# Patient Record
Sex: FEMALE | Race: WHITE
[De-identification: names, ages, dates, MRNs, and addresses within clinical notes are randomized per-mention and may not be internally consistent; named-entity substitution may affect disease eponyms.]

---

## 2017-04-04 NOTE — US
EXAM DESCRIPTION:

Gall Bladder



CLINICAL HISTORY:

61 years Female, RIGHT UPPER QUAD PAIN



Grayscale ultrasound of the liver, gallbladder and pancreas was

performed.



FINDINGS:

The liver is echogenic. It is normal in size measuring 13-14 cm

in diameter. No hepatic mass at this time. The hepatic veins and

portal vein are patent.



The pancreas is unremarkable on today's study.



The gallbladder is well visualized. No evidence of cholelithiasis

patient reportedly positive sonographic Paredes's sign.



IMPRESSION:

Probable fatty infiltration of the liver.



Gallbladder is well visualized. While the patient reports a

positive sonographic Paredes sign no definitive echogenic gall

stones on today's study.



Electronically signed by:  Emil Raymundo MD  4/4/2017 10:17 AM

CDT

## 2017-04-14 NOTE — NM
EXAM DESCRIPTION:



Hepatobiliar w/CCK



CLINICAL HISTORY:



61 years,Female, abdominal pain



COMPARISON:



None





TECHNIQUE:



The patient was injected with six mCi of technetium-99 M labeled

Mebrofenin followed by imaging of the right upper quadrant. After

1 hr CCK was injected for the gallbladder ejection fraction.





FINDINGS:



Normal homogeneous uptake of radiopharmaceutical is seen

throughout the liver. There isprompt excretion into the biliary

collecting system, gallbladder, and small bowel.



The gallbladder ejection fraction is 89 %.  Normal range is

greater than 30 or 35%. 









IMPRESSION:



Unremarkable HIDA scan. The ejection fraction of the gallbladder

is a 89%. 







Electronically signed by:  Eris Tony MD  4/14/2017 11:26 AM CDT

## 2017-04-24 NOTE — CT
EXAM DESCRIPTION: 



Abdomen/Pelvis w/wo Contrast



CLINICAL HISTORY: 



ABDOMINAL PAIN



COMPARISON: 



None Available



TECHNIQUE: 



CT of the abdomen and Pelvis was performed with and without IV

contrast. This exam was performed according to our departmental

dose-optimization program, which includes automated exposure

control, adjustment of the mA and/or kV according to patient size

and/or use of iterative reconstruction technique.



FINDINGS: 



The lung bases are unremarkable. There is no calcified gallstone

or renal calculus. No abdominal aortic aneurysm or dissection. No

adenopathy, ascites or pneumoperitoneum. The liver, spleen,

pancreas, adrenals and kidneys are unremarkable. There are no

dilated small bowel loops. No bladder wall thickening. Diffusely

heterogeneous appearance and lobulated contour of the uterus

suggests several uterine fibroids. There is a partially calcified

1.4 cm submucosal fibroid in the uterine fundus. There is a 1.5

cm right adnexal cyst which may be of right ovarian origin.

Considering its size, this is almost certainly benign, and no

follow-up imaging is recommended. There is occasional colonic

diverticulosis without diverticulitis. The appendix is normal. No

suspicious bone lesion.



IMPRESSION: 



Uterine fibroids as detailed above, but no acute abnormality in

the abdomen or pelvis to explain patient's symptoms.



Colonic diverticulosis without diverticulitis.



Electronically signed by:  Andrey Shin MD  4/24/2017 7:43 AM CDT

## 2019-04-26 NOTE — MRI
EXAM DESCRIPTION: 

Brain w/wo Contrast: Magnetic Resonance Imaging.



CLINICAL HISTORY: 

63 years Female MULTIPLE SCLEROSIS



COMPARISON: 

MRI scan of the brain with and without contrast 12/18/2015.



TECHNIQUE: 

Multiplanar, high-field MRI, multiple conventional sequences,

without and with gadolinium IV  contrast. No adverse reactions.

Multiple axial diffusion sequences. 



FINDINGS: 

Small foci of hyperintense FLAIR and T2-weighted signal in the

anterior right frontal corona radiata. Also the right frontal

subcortical white matter near the vertex. No mass effect

hemorrhage or diffusion restriction or contrast enhancement

associated with these 2 sites..  Normal signal in the bilateral

basal ganglia. No hemorrhage, no cerebral edema, no mass-effect.

Normal contrast enhancement. Normal signal in the brainstem and

cerebellar hemispheres. No hemorrhage, no cerebral edema, no

mass-effect. Normal contrast enhancement.



Concordance of the diffusion and non-diffusion sequences with no

evidence of acute or subacute infarction. Cortical sulci,

ventricles, and other CSF spaces, and the subdural spaces are

normally configured for patients age. No effacement or

displacement. No midline shift. No extra-axial hemorrhage. Normal

contrast enhancement.



Normal flow signal void in the major vessels of the [place]

[place], and the venous sinuses. IACs are symmetric bilaterally.

Minimal fluid in a few of the left mastoid air cells. Normal

signal on the right. No mass effect in the bilateral

Cerebellopontine angles. Normal contrast enhancement.



Pituitary gland occupies approximately 50% of the sella. Normal

contrast enhancement. Base of the cerebellar tonsils is at the

level of the foramen magnum. Normal signal in the paranasal

sinuses. The bony calvarium is intact.



IMPRESSION: 

1. 2 sites of abnormal T2 and FLAIR signal in the right frontal

lobe periventricular white matter and subcortical white matter.

Not associated with mass effect hemorrhage or abnormal contrast

enhancement or diffusion restriction. Improvement compared to the

prior MRI scan in 2015.

2. Minimal right mastoiditis. No diffusion restriction indicating

no evidence of acute or subacute infarction or severe ischemia.



Electronically signed by:  Nilson Abarca MD  4/26/2019 1:51 PM CDT

Workstation: [id number]

## 2019-08-28 NOTE — RAD
EXAM DESCRIPTION: Lumbar Spine 3 Views



CLINICAL HISTORY: 63 years Female, PAIN IN UNSPECIFIED HIP RIGHT



COMPARISON: None.



TECHNIQUE: 3 views of the lumbar spine.



FINDINGS: Mild diffuse osteopenia of the visualized bones noted.

The normal lumbar lordotic curvature is well-maintained. The

vertebral body heights are well-maintained with no acute

compression deformity. The intervertebral disc space are

well-maintained. Grade 1 anterolisthesis of L5 over S1 noted.

Mild multilevel degenerative changes noted. The pre and

paravertebral soft tissues appear grossly unremarkable.



IMPRESSION:



1. No acute compression deformity.

2. Mild multilevel degenerative changes more predominant at L5-S1

level with grade 1 anterolisthesis of L5 over S1.



Electronically signed by:  Bryce Jefferson MD  8/28/2019

11:12 AM CDT Workstation: [id number]

## 2019-08-28 NOTE — RAD
EXAM DESCRIPTION: Hip,Right 2 Views



CLINICAL HISTORY: 63 years Female, PAIN IN UNSPECIFIED HIP

RIGHTPAIN IN UNSPECIFIED HIP RIGHT



COMPARISON: None available. Technique: AP and lateral radiograph

of the right hip.



FINDINGS: The visualized bones are well-mineralized.No acute

fracture or dislocation. The femoral head is well contained in

the acetabular fossa. Mild to moderate right hip joint

osteoarthritic changes noted.

The soft tissues appear grossly unremarkable. 



IMPRESSION:

1. No acute fracture or dislocation. Mild to moderate right hip

joint osteoarthritic changes.



Electronically signed by:  Bryce Jefferson MD  8/28/2019

11:13 AM CDT Workstation: [id number]

## 2019-08-28 NOTE — RAD
EXAM DESCRIPTION: 



Pelvis, single view



CLINICAL HISTORY: 



PAIN IN UNSPECIFIED HIP RIGHT 



FINDINGS/ IMPRESSION: 



Normal mineralization. No fracture or focal osteochondral lesion.

No advanced arthrosis





Electronically signed by:  Dago Ward MD  8/28/2019 11:04

AM CDT Workstation: [id number]

## 2019-10-07 NOTE — MRI
Study: MRI of the Right Hip. 



Indication: HIP PAIN  



Technique: Multiplanar, multi sequence MRI of the right hip was

obtained without intravenous contrast. 



Comparison: None.



Findings:



Full-thickness tear at the base of the anterior superior right

hip labrum at the 2:00 to 1:00 position with mild free edge

truncation. Tiny right hip cam lesion noted. Slight labral over

coverage present as well.



No acute fracture or osteonecrosis of the right hip. Grade 2 and

mild grade 3 chondral thinning throughout the right hip joint. No

full-thickness chondral defect. Tiny joint effusion.



Tendinosis bilateral gluteus minimus/medius tendon insertions

with moderate bilateral greater trochanter bursal edema.

Tendinosis bilateral hamstring tendon origins. Moderate pubic

symphysis osteoarthritis.



Impression:



Mild right hip osteoarthritis with a tiny joint effusion and

anterior superior labral tearing. No acute fracture or

osteonecrosis.



Additional findings as above.



 



Electronically signed by:  Crispin Lanza MD  10/7/2019 11:31 AM

CDT Workstation: [id number]

## 2019-10-07 NOTE — MRI
EXAM DESCRIPTION: 



Lumbar Spine w/o Contrast



CLINICAL HISTORY: 



63 years, Female, LOW BACK PAIN



COMPARISON: 



None



TECHNIQUE: 



Multiplanar multi sequence images of the lumbar spine were

obtained without gadolinium contrast.



FINDINGS: 



Vertebral body height and alignment are well maintained. No bone

marrow signal abnormalities. The conus lies posterior to the L1-2

disc, and the cauda equina is unremarkable. The paraspinal and

visualized retroperitoneal soft tissues are unremarkable. Small

Tarlov cyst in the central canal posterior to the S2 segment seen

only on sagittal images and a doubtful clinical significance.



L1-2:  No disc bulging or facet joint degeneration. 

L2-3:  No significant disc bulging or facet joint degeneration.

L3-4:  The intervertebral disc and facet joints are unremarkable.

L4-5:  No disc bulging or facet joint degeneration.

L5-S1:  No disc bulging or facet joint degeneration.



IMPRESSION: 



No acute abnormality or significant degenerative changes in the

lumbar spine.



Electronically signed by:  Andrey Shin MD  10/7/2019 10:56 AM CDT

Workstation: [id number]

## 2020-01-09 ENCOUNTER — HOSPITAL ENCOUNTER (OUTPATIENT)
Dept: HOSPITAL 39 - LAB.O | Age: 65
End: 2020-01-09
Attending: FAMILY MEDICINE
Payer: COMMERCIAL

## 2020-01-09 DIAGNOSIS — E78.5: ICD-10-CM

## 2020-01-09 DIAGNOSIS — R10.13: Primary | ICD-10-CM

## 2020-01-09 NOTE — US
EXAM DESCRIPTION: Gall Bladder



CLINICAL HISTORY: EPIGASTRIC PAIN



COMPARISON: Previous gallbladder sonogram April 4, 2017



TECHNIQUE: Right upper quadrant ultrasound



FINDINGS:



Pancreas: Visualized portions of the pancreas show prominent

pancreatic duct 2.1 mm in diameter, unchanged from previous

study. Bowel gas obscures some areas.



Aorta/inferior vena cava: No aortic aneurysm. Normal inferior

vena cava.



Liver: The liver is homogeneous in texture with normal to

slightly increased echogenicity of the hepatic parenchyma. No

focal liver lesion or intrahepatic bile duct dilatation. No liver

surface irregularity. Normal appearance of the portal vein and

hepatic veins.



Gallbladder: Gallbladder appears normal  with no intraluminal

stones or wall thickening. 



Common bile duct: Normal caliber measuring 4.6 mm.



Right kidney: Renal length is 9.4 cm. Normal cortical

echogenicity. Cortical thickness is normal. No hydronephrosis is

seen. No renal mass or shadowing calculus.



IMPRESSION:



No acute process. No change compared to previous study.







Electronically signed by:  Myles Alex MD  1/9/2020 5:32 PM

CST Workstation: [id number]

## 2020-01-31 ENCOUNTER — HOSPITAL ENCOUNTER (OUTPATIENT)
Dept: HOSPITAL 39 - AMB | Age: 65
Discharge: HOME | End: 2020-01-31
Attending: SPECIALIST
Payer: COMMERCIAL

## 2020-01-31 VITALS — TEMPERATURE: 98.2 F | OXYGEN SATURATION: 98 % | DIASTOLIC BLOOD PRESSURE: 67 MMHG | SYSTOLIC BLOOD PRESSURE: 115 MMHG

## 2020-01-31 DIAGNOSIS — K29.70: ICD-10-CM

## 2020-01-31 DIAGNOSIS — K21.9: ICD-10-CM

## 2020-01-31 DIAGNOSIS — F02.80: ICD-10-CM

## 2020-01-31 DIAGNOSIS — K31.89: ICD-10-CM

## 2020-01-31 DIAGNOSIS — G30.9: ICD-10-CM

## 2020-01-31 DIAGNOSIS — Z79.899: ICD-10-CM

## 2020-01-31 DIAGNOSIS — R19.5: Primary | ICD-10-CM

## 2020-01-31 DIAGNOSIS — K59.00: ICD-10-CM

## 2020-01-31 DIAGNOSIS — E78.00: ICD-10-CM

## 2020-01-31 PROCEDURE — 87101 SKIN FUNGI CULTURE: CPT

## 2020-01-31 PROCEDURE — 45380 COLONOSCOPY AND BIOPSY: CPT

## 2020-01-31 PROCEDURE — 00813 ANES UPR LWR GI NDSC PX: CPT

## 2020-01-31 PROCEDURE — 43239 EGD BIOPSY SINGLE/MULTIPLE: CPT

## 2020-01-31 NOTE — OP
DATE OF PROCEDURE:  01/31/20



PREOPERATIVE DIAGNOSIS: 

1.  Hemoccult positive.

2.  Abdominal epigastric pain.



POSTOPERATIVE DIAGNOSIS: 

1.  Gastritis, possible fungal growth.

2.  Inflammation of the cecum.



PROCEDURE: 

1.  EGD with stomach biopsy times 2.

2.  Colonoscopy with cecal biopsy times 1.



SURGEON:  Lester Whitehead MD



ANESTHESIA: General.



FINDINGS: As described.



PROCEDURE:  In the lateral position with a bite block, general anesthesia was 
induced.  The endoscope was entered without difficulty.  The esophagus going in 
appeared normal, as did the Z-line.  We entered the stomach and went into the 
second portion of the duodenum without difficulty.  There were no abnormalities 
seen.  Upon withdrawal, we did see a lot of white plaques consistent with 
possible fungus on the stomach itself, primarily in the antrum and distal body, 
so biopsy of this was taken for fungal stain.  Additional biopsy was taken as 
well and sent for pathology and H. pylori.  Retroflexion was normal.  There were
no other abnormalities seen.  Upon withdrawal, the esophagus remained normal.



She was then repositioned and colonoscopy was performed.  Digital rectal exam 
was normal.  The colonoscope was then passed without difficulty all the way to 
the cecum identified by the ileocecal valve.  The cecum itself, going into just 
the proximal ascending had a lot of linear vascular streaks consistent with a 
colitis.  No mucus, no ulcers, but more vascularity, so representative biopsy 
was taken.  It was irrigated and examined.  There was no free blood.  Upon 
additional withdrawal, no other polyps were seen.  It was a little spasmodic and
thick walled in places, but otherwise no polyps.  On retroflexion, the rectum 
was normal.  There were small hemorrhoids.  The patient tolerated the procedure 
and was then awakened and taken to Recovery to be discharged.  We will followup 
with pathology results.  



#[id number]

[facility]

## 2020-02-02 ENCOUNTER — HOSPITAL ENCOUNTER (EMERGENCY)
Dept: HOSPITAL 39 - ER | Age: 65
Discharge: HOME | End: 2020-02-02
Payer: COMMERCIAL

## 2020-02-02 VITALS — OXYGEN SATURATION: 96 % | DIASTOLIC BLOOD PRESSURE: 74 MMHG | SYSTOLIC BLOOD PRESSURE: 101 MMHG

## 2020-02-02 VITALS — TEMPERATURE: 97.4 F

## 2020-02-02 DIAGNOSIS — Z79.899: ICD-10-CM

## 2020-02-02 DIAGNOSIS — S00.83XA: ICD-10-CM

## 2020-02-02 DIAGNOSIS — S09.90XA: Primary | ICD-10-CM
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
## 2020-02-02 NOTE — ED.PDOC
History of Present Illness





- General


Chief Complaint: Trauma


Stated Complaint: unwitnessed fall at home


Time Seen by Provider: 02/02/20 22:06





- History of Present Illness


Initial Comments: 





Pt has bruise on her forehead ,  is concern that she has dementia and she

does not know when she got the injury on the forehead , no witness for the fall 

, no witness if pt was unconscious.


Timing/Duration: 4-6 hours


Severity: moderate


Improving Factors: nothing


Worsening Factors: nothing


Associated Symptoms: denies symptoms


Allergies/Adverse Reactions: 


Allergies





NO KNOWN ALLERGY Allergy (Verified 02/02/20 22:09)


   





Home Medications: 


Ambulatory Orders





Atorvastatin Calcium [Lipitor] 20 mg PO DAILY 01/30/20 


Cephalexin 500 mg PO TID 01/30/20 


Donepezil Hydrochloride [Donepezil HCl] 10 mg PO BEDTIME 01/30/20 


Pantoprazole Sodium 40 mg PO DAILY 01/30/20 


Propranolol HCl 40 mg PO DAILY 01/30/20 


QUEtiapine FUMARATE [SEROquel] 25 mg PO BEDTIME 01/30/20 


Sucralfate Tab [Carafate Tab] 1 gm PO ACHS 01/30/20 











Review of Systems





- Review of Systems


Constitutional: States: no symptoms reported


EENTM: States: no symptoms reported


Respiratory: States: no symptoms reported


Cardiology: States: no symptoms reported


Gastrointestinal/Abdominal: States: no symptoms reported


Genitourinary: States: no symptoms reported


Musculoskeletal: States: no symptoms reported


Skin: States: no symptoms reported


Neurological: States: see HPI


Endocrine: States: no symptoms reported


Hematologic/Lymphatic: States: no symptoms reported





Past Medical History (General)





- Patient Medical History


Hx Seizures: No


Hx Stroke: No


Hx Dementia: No


Hx Asthma: No


Hx of COPD: No


Hx Cardiac Disorders: No


Hx Congestive Heart Failure: No


Hx Pacemaker: No


Hx Hypertension: No


Hx Thyroid Disease: No


Hx Diabetes: No


Hx Gastroesophageal Reflux: No


Hx Renal Disease: No


Hx Cancer: No


Hx of HIV: No


Hx Hepatitis C: No


Hx MRSA: No


Surgical History: other





- Vaccination History


Hx Tetanus, Diphtheria Vaccination: No


Hx Influenza Vaccination: No


Hx Pneumococcal Vaccination: Yes





- Social History


Hx Alcohol Use: Yes - occ.





Family Medical History





- Family History


  ** Mother


Family History: Unknown





Physical Exam





- Physical Exam


General Appearance: Alert, Comfortable


Ears, Nose, Throat: hearing grossly normal, normal ENT inspection


Neck: non-tender, full range of motion, supple, normal inspection


Respiratory: chest non-tender, lungs clear, normal breath sounds, no respiratory

distress, no accessory muscle use


Cardiovascular/Chest: regular rate, rhythm, no edema, no gallop, no JVD, no 

murmur


Gastrointestinal/Abdominal: normal bowel sounds, non tender, soft, no 

organomegaly, no pulsatile mass


Back Exam: normal inspection, no CVA tenderness, no vertebral tenderness


Extremity: normal range of motion, non-tender, normal inspection, no pedal 

edema, no calf tenderness


Neurologic: CNs II-XII nml as tested, no motor/sensory deficits, alert, normal 

mood/affect, oriented x 3


Skin Exam: normal color, warm/dry, cyanosis





Departure





- Departure


Clinical Impression: 


 Head injury





Time of Disposition: 22:22


Disposition: Discharge to Home or Self Care


Condition: Good


Departure Forms:  ED Discharge - Pt. Copy, Patient Portal Self Enrollment


Diet: resume usual diet


Activity: increase activity as tolerated, walking as tolerated


Referrals: 


Isacc Espino MD [Family Provider] - 1-2 Weeks


Home Medications: 


Ambulatory Orders





Atorvastatin Calcium [Lipitor] 20 mg PO DAILY 01/30/20 


Cephalexin 500 mg PO TID 01/30/20 


Donepezil Hydrochloride [Donepezil HCl] 10 mg PO BEDTIME 01/30/20 


Pantoprazole Sodium 40 mg PO DAILY 01/30/20 


Propranolol HCl 40 mg PO DAILY 01/30/20 


QUEtiapine FUMARATE [SEROquel] 25 mg PO BEDTIME 01/30/20 


Sucralfate Tab [Carafate Tab] 1 gm PO ACHS 01/30/20

## 2020-02-02 NOTE — CT
EXAM:

  CT Head Without Intravenous Contrast



CLINICAL HISTORY:

  The patient is 64 years old and is Female; injury to head pain



TECHNIQUE:

  Axial computed tomography images of the head/brain without

intravenous contrast.  Sagittal and coronal reformatted images

were created and reviewed.  This CT exam was performed using one

or more of the following dose reduction techniques:  automated

exposure control, adjustment of the mA and/or kV according to

patient size, and/or use of iterative reconstruction technique.



COMPARISON:

  No relevant prior studies available.



FINDINGS:

  BRAIN:  Unremarkable.  The gray-white matter differentiation is

preserved . No hemorrhage.  No significant white matter disease. 

No edema. No extra-axial fluid collections.

  VENTRICLES:  Unremarkable.  No ventriculomegaly.

  BONES/JOINTS:  No acute fracture.

  SOFT TISSUES:  Unremarkable.

  SINUSES:  Unremarkable as visualized.  No acute sinusitis.

  MASTOID AIR CELLS:  Unremarkable as visualized.  No mastoid

effusion.

  ORBITS:  Unremarkable as visualized.



IMPRESSION:     

  No acute intracranial findings.



Electronically signed by:  Brunilda Lloyd MD  2/2/2020 10:44 PM

[facility] Workstation: [id number]

## 2020-02-18 ENCOUNTER — HOSPITAL ENCOUNTER (OUTPATIENT)
Dept: HOSPITAL 39 - GMAE | Age: 65
End: 2020-02-18
Attending: FAMILY MEDICINE
Payer: COMMERCIAL

## 2020-02-18 DIAGNOSIS — E03.9: Primary | ICD-10-CM
